# Patient Record
Sex: MALE | Race: OTHER | Employment: FULL TIME | URBAN - METROPOLITAN AREA
[De-identification: names, ages, dates, MRNs, and addresses within clinical notes are randomized per-mention and may not be internally consistent; named-entity substitution may affect disease eponyms.]

---

## 2023-11-07 ENCOUNTER — HOSPITAL ENCOUNTER (EMERGENCY)
Facility: HOSPITAL | Age: 48
Discharge: HOME | End: 2023-11-08
Attending: EMERGENCY MEDICINE
Payer: MEDICAID

## 2023-11-07 DIAGNOSIS — T78.40XA ALLERGIC REACTION, INITIAL ENCOUNTER: Primary | ICD-10-CM

## 2023-11-07 PROCEDURE — 99282 EMERGENCY DEPT VISIT SF MDM: CPT

## 2023-11-07 PROCEDURE — 99284 EMERGENCY DEPT VISIT MOD MDM: CPT | Performed by: EMERGENCY MEDICINE

## 2023-11-07 RX ORDER — DIPHENHYDRAMINE HCL 25 MG
50 TABLET ORAL ONCE
Status: COMPLETED | OUTPATIENT
Start: 2023-11-07 | End: 2023-11-07

## 2023-11-07 RX ORDER — PREDNISONE 20 MG/1
60 TABLET ORAL ONCE
Status: COMPLETED | OUTPATIENT
Start: 2023-11-07 | End: 2023-11-07

## 2023-11-07 RX ORDER — FAMOTIDINE 20 MG/1
20 TABLET, FILM COATED ORAL ONCE
Status: COMPLETED | OUTPATIENT
Start: 2023-11-07 | End: 2023-11-07

## 2023-11-07 RX ADMIN — FAMOTIDINE 20 MG: 20 TABLET, FILM COATED ORAL at 22:27

## 2023-11-07 RX ADMIN — DIPHENHYDRAMINE HCL 50 MG: 25 TABLET, COATED ORAL at 22:27

## 2023-11-07 RX ADMIN — PREDNISONE 60 MG: 20 TABLET ORAL at 22:25

## 2023-11-08 VITALS
RESPIRATION RATE: 22 BRPM | TEMPERATURE: 98 F | OXYGEN SATURATION: 96 % | HEART RATE: 90 BPM | SYSTOLIC BLOOD PRESSURE: 149 MMHG | DIASTOLIC BLOOD PRESSURE: 71 MMHG

## 2023-11-08 RX ORDER — DIPHENHYDRAMINE HCL 25 MG
25 TABLET ORAL EVERY 6 HOURS
Qty: 20 TABLET | Refills: 0 | Status: SHIPPED | OUTPATIENT
Start: 2023-11-08

## 2023-11-08 RX ORDER — EPINEPHRINE 0.3 MG/.3ML
0.3 INJECTION SUBCUTANEOUS ONCE
Qty: 0.6 ML | Refills: 0 | Status: SHIPPED | OUTPATIENT
Start: 2023-11-08 | End: 2023-11-08

## 2023-11-08 RX ORDER — PREDNISONE 20 MG/1
60 TABLET ORAL DAILY
Qty: 9 TABLET | Refills: 0 | Status: SHIPPED | OUTPATIENT
Start: 2023-11-08 | End: 2023-11-11

## 2023-11-08 NOTE — ED PROVIDER NOTES
History  Chief Complaint   Patient presents with    Allergic Reaction     Patient ate shrimp and has shrimp allergy, patient reports a little trouble breathing, patient shaking in triage     This is a 50 y.o. male here with probable allergic reaction that started about 10 minutes after the patient he a rice dish with shrimp. This was about 2 hours ago. Patient has no known drug or food allergies. Has eaten this dish many times. Unsure if there were new spices or something else in it that he has never had before. Patient started to notice a rash, stated that he felt a funny feeling in his throat but was able to swallow and breathe just fine. Patient took Zyrtec just prior to arrival.   Patient has been doing well without any decline since the initial start of the reaction. History provided by:  Patient and relative   used: No    Allergic Reaction  Presenting symptoms: rash        None       No past medical history on file. No past surgical history on file. No family history on file. I have reviewed and agree with the history as documented. No existing history information found. No existing history information found. Review of Systems   Constitutional:  Negative for chills and fever. HENT:  Negative for ear pain, facial swelling and sore throat. Eyes:  Negative for pain and visual disturbance. Respiratory:  Negative for cough and shortness of breath. Cardiovascular:  Negative for chest pain and palpitations. Gastrointestinal:  Negative for abdominal pain, diarrhea, nausea and vomiting. Genitourinary:  Negative for dysuria and hematuria. Musculoskeletal:  Negative for arthralgias and back pain. Skin:  Positive for color change and rash. Allergic/Immunologic: Negative for immunocompromised state. Neurological:  Negative for seizures and syncope. Psychiatric/Behavioral:  The patient is nervous/anxious.     All other systems reviewed and are negative. Physical Exam  Physical Exam  Vitals and nursing note reviewed. Constitutional:       General: He is not in acute distress. Appearance: He is well-developed. He is not ill-appearing, toxic-appearing or diaphoretic. HENT:      Head: Normocephalic and atraumatic. Jaw: There is normal jaw occlusion. Right Ear: External ear normal.      Left Ear: External ear normal.      Nose: No congestion. Mouth/Throat:      Lips: Pink. Mouth: Mucous membranes are moist.      Pharynx: Oropharynx is clear. Uvula midline. No pharyngeal swelling, oropharyngeal exudate, posterior oropharyngeal erythema or uvula swelling. Eyes:      General: No scleral icterus. Conjunctiva/sclera: Conjunctivae normal.      Right eye: Right conjunctiva is not injected. Left eye: Left conjunctiva is not injected. Pupils: Pupils are equal, round, and reactive to light. Neck:      Trachea: No tracheal deviation. Cardiovascular:      Rate and Rhythm: Regular rhythm. Tachycardia present. Pulses: Normal pulses. Heart sounds: Normal heart sounds. No murmur heard. No friction rub. Pulmonary:      Effort: Pulmonary effort is normal. No respiratory distress. Breath sounds: Normal breath sounds. No stridor. No wheezing or rales. Abdominal:      General: There is no distension. Palpations: Abdomen is soft. Tenderness: There is no abdominal tenderness. There is no guarding or rebound. Musculoskeletal:         General: No tenderness or deformity. Normal range of motion. Cervical back: Normal range of motion and neck supple. Skin:     General: Skin is warm and dry. Capillary Refill: Capillary refill takes less than 2 seconds. Coloration: Skin is not pale. Findings: Rash present. Rash is urticarial.      Comments: Diffuse urticaria over the arms, legs and trunk. Face seems spared at this time.    Neurological:      Mental Status: He is alert and oriented to person, place, and time. Motor: No abnormal muscle tone. Psychiatric:         Mood and Affect: Mood normal.         Behavior: Behavior normal.         Vital Signs  ED Triage Vitals [11/07/23 2152]   Temperature Pulse Respirations Blood Pressure SpO2   98 °F (36.7 °C) 102 22 (!) 180/76 96 %      Temp Source Heart Rate Source Patient Position - Orthostatic VS BP Location FiO2 (%)   Oral Monitor Sitting Right arm --      Pain Score       --           Vitals:    11/07/23 2300 11/07/23 2330 11/08/23 0000 11/08/23 0030   BP: 151/90 151/90 159/88 149/71   Pulse: 98 94 92 90   Patient Position - Orthostatic VS: Lying Lying Lying Lying         Visual Acuity      ED Medications  Medications   diphenhydrAMINE (BENADRYL) tablet 50 mg (50 mg Oral Given 11/7/23 2227)   famotidine (PEPCID) tablet 20 mg (20 mg Oral Given 11/7/23 2227)   predniSONE tablet 60 mg (60 mg Oral Given 11/7/23 2225)       Diagnostic Studies  Results Reviewed       None                   No orders to display              Procedures  Procedures         ED Course                               SBIRT 20yo+      Flowsheet Row Most Recent Value   Initial Alcohol Screen: US AUDIT-C     1. How often do you have a drink containing alcohol? 0 Filed at: 11/07/2023 2229   2. How many drinks containing alcohol do you have on a typical day you are drinking? 0 Filed at: 11/07/2023 2229   3a. Male UNDER 65: How often do you have five or more drinks on one occasion? 0 Filed at: 11/07/2023 2229   Audit-C Score 0 Filed at: 11/07/2023 2229   FRED: How many times in the past year have you. .. Used an illegal drug or used a prescription medication for non-medical reasons? Never Filed at: 11/07/2023 2229                      Medical Decision Making  Patient appears well on exam.  No signs of angioedema. He is anxious but has an overall reassuring exam with just urticaria at this time. There is no airway involvement.   This reaction involves the following systems: yes  Skin (rash)  no  GI tract (vomiting/diarrhea)   no  Upper respiratory tract (oropharyngeal swelling or throat swelling)   no  Lower respiratory tract (wheezing)   no  Cardiovascular system (palpitations or syncope). We will start treatment with p.o. Benadryl, Pepcid, prednisone and observed for 3 hours here in the emergency department on the cardiac monitor for potential declining condition. 1:05 AM  Patient has been observed here without any declining condition. Patient feels back to baseline without any trouble breathing, trouble swallowing and rash is improving. Will discharge home with 3 more days of prednisone, Benadryl as needed, follow-up with allergy for testing, advised him not to eat any shellfish or shrimp, return to ER precautions and an EpiPen to use just in case. Patient understands and agrees with plan of care. Questions and concerns answered. Risk  OTC drugs. Prescription drug management. Disposition  Final diagnoses: Allergic reaction, initial encounter     Time reflects when diagnosis was documented in both MDM as applicable and the Disposition within this note       Time User Action Codes Description Comment    11/8/2023  1:04 AM Tessy Henriquez Add [T78.40XA] Allergic reaction, initial encounter           ED Disposition       ED Disposition   Discharge    Condition   Stable    Date/Time   Wed Nov 8, 2023 0104    Comment   Aaron Oneill [facility] discharge to home/self care.                    Follow-up Information       Follow up With Specialties Details Why Contact Info    Dennis Chowdary MD Allergy Call today To schedule an appointment as soon as you can [address] [facility]  [phone]              Patient's Medications   Discharge Prescriptions    DIPHENHYDRAMINE (BENADRYL) 25 MG TABLET    Take 1 tablet (25 mg total) by mouth every 6 (six) hours       Start Date: 11/8/2023 End Date: --       Order Dose: 25 mg Quantity: 20 tablet    Refills: 0    EPINEPHRINE (EPIPEN) 0.3 MG/0.3 ML SOAJ    Inject 0.3 mL (0.3 mg total) into a muscle once for 1 dose       Start Date: 11/8/2023 End Date: 11/8/2023       Order Dose: 0.3 mg       Quantity: 0.6 mL    Refills: 0    PREDNISONE 20 MG TABLET    Take 3 tablets (60 mg total) by mouth daily for 3 days       Start Date: 11/8/2023 End Date: 11/11/2023       Order Dose: 60 mg       Quantity: 9 tablet    Refills: 0       No discharge procedures on file.     PDMP Review       None            ED Provider  Electronically Signed by             Misael Dillon., DO  11/08/23 0106